# Patient Record
Sex: MALE | Race: WHITE | NOT HISPANIC OR LATINO | Employment: FULL TIME | URBAN - METROPOLITAN AREA
[De-identification: names, ages, dates, MRNs, and addresses within clinical notes are randomized per-mention and may not be internally consistent; named-entity substitution may affect disease eponyms.]

---

## 2017-02-13 ENCOUNTER — ALLSCRIPTS OFFICE VISIT (OUTPATIENT)
Dept: OTHER | Facility: OTHER | Age: 42
End: 2017-02-13

## 2017-03-10 ENCOUNTER — GENERIC CONVERSION - ENCOUNTER (OUTPATIENT)
Dept: OTHER | Facility: OTHER | Age: 42
End: 2017-03-10

## 2017-03-10 DIAGNOSIS — R74.8 ABNORMAL LEVELS OF OTHER SERUM ENZYMES: ICD-10-CM

## 2017-03-10 DIAGNOSIS — E55.9 VITAMIN D DEFICIENCY: ICD-10-CM

## 2017-03-10 DIAGNOSIS — E66.3 OVERWEIGHT: ICD-10-CM

## 2017-03-10 DIAGNOSIS — I10 ESSENTIAL (PRIMARY) HYPERTENSION: ICD-10-CM

## 2017-03-10 DIAGNOSIS — E78.5 HYPERLIPIDEMIA: ICD-10-CM

## 2017-05-02 ENCOUNTER — GENERIC CONVERSION - ENCOUNTER (OUTPATIENT)
Dept: OTHER | Facility: OTHER | Age: 42
End: 2017-05-02

## 2017-05-02 ENCOUNTER — LAB CONVERSION - ENCOUNTER (OUTPATIENT)
Dept: OTHER | Facility: OTHER | Age: 42
End: 2017-05-02

## 2017-05-02 LAB
25(OH)D3 SERPL-MCNC: 30 NG/ML (ref 30–100)
A/G RATIO (HISTORICAL): 1.4 (CALC) (ref 1–2.5)
ALBUMIN SERPL BCP-MCNC: 4.4 G/DL (ref 3.6–5.1)
ALP SERPL-CCNC: 69 U/L (ref 40–115)
ALT SERPL W P-5'-P-CCNC: 16 U/L (ref 9–46)
AST SERPL W P-5'-P-CCNC: 16 U/L (ref 10–40)
BASOPHILS # BLD AUTO: 0.8 %
BASOPHILS # BLD AUTO: 58 CELLS/UL (ref 0–200)
BILIRUB SERPL-MCNC: 0.7 MG/DL (ref 0.2–1.2)
BILIRUB UR QL STRIP: NEGATIVE
BUN SERPL-MCNC: 10 MG/DL (ref 7–25)
BUN/CREA RATIO (HISTORICAL): NORMAL (CALC) (ref 6–22)
CALCIUM SERPL-MCNC: 9.8 MG/DL (ref 8.6–10.3)
CHLORIDE SERPL-SCNC: 99 MMOL/L (ref 98–110)
CHOLEST SERPL-MCNC: 181 MG/DL (ref 125–200)
CHOLEST/HDLC SERPL: 4.5 (CALC)
CO2 SERPL-SCNC: 27 MMOL/L (ref 20–31)
COLOR UR: YELLOW
COMMENT (HISTORICAL): CLEAR
CREAT SERPL-MCNC: 0.99 MG/DL (ref 0.6–1.35)
DEPRECATED RDW RBC AUTO: 13.1 % (ref 11–15)
EGFR AFRICAN AMERICAN (HISTORICAL): 108 ML/MIN/1.73M2
EGFR-AMERICAN CALC (HISTORICAL): 94 ML/MIN/1.73M2
EOSINOPHIL # BLD AUTO: 1.9 %
EOSINOPHIL # BLD AUTO: 137 CELLS/UL (ref 15–500)
FECAL OCCULT BLOOD DIAGNOSTIC (HISTORICAL): NEGATIVE
GAMMA GLOBULIN (HISTORICAL): 3.2 G/DL (CALC) (ref 1.9–3.7)
GLUCOSE (HISTORICAL): 88 MG/DL (ref 65–99)
GLUCOSE (HISTORICAL): NEGATIVE
HBA1C MFR BLD HPLC: 5.4 % OF TOTAL HGB
HCT VFR BLD AUTO: 45.1 % (ref 38.5–50)
HDLC SERPL-MCNC: 40 MG/DL
HGB BLD-MCNC: 15.3 G/DL (ref 13.2–17.1)
KETONES UR STRIP-MCNC: NEGATIVE MG/DL
LDL CHOLESTEROL (HISTORICAL): 125 MG/DL (CALC)
LEUKOCYTE ESTERASE UR QL STRIP: NEGATIVE
LYMPHOCYTES # BLD AUTO: 2246 CELLS/UL (ref 850–3900)
LYMPHOCYTES # BLD AUTO: 31.2 %
MCH RBC QN AUTO: 28.8 PG (ref 27–33)
MCHC RBC AUTO-ENTMCNC: 34 G/DL (ref 32–36)
MCV RBC AUTO: 84.7 FL (ref 80–100)
MONOCYTES # BLD AUTO: 648 CELLS/UL (ref 200–950)
MONOCYTES (HISTORICAL): 9 %
NEUTROPHILS # BLD AUTO: 4111 CELLS/UL (ref 1500–7800)
NEUTROPHILS # BLD AUTO: 57.1 %
NITRITE UR QL STRIP: NEGATIVE
NON-HDL-CHOL (CHOL-HDL) (HISTORICAL): 141 MG/DL (CALC)
PH UR STRIP.AUTO: 7 [PH] (ref 5–8)
PLATELET # BLD AUTO: 364 THOUSAND/UL (ref 140–400)
PMV BLD AUTO: 7.6 FL (ref 7.5–12.5)
POTASSIUM SERPL-SCNC: 3.8 MMOL/L (ref 3.5–5.3)
PROT UR STRIP-MCNC: NEGATIVE MG/DL
RBC # BLD AUTO: 5.32 MILLION/UL (ref 4.2–5.8)
SODIUM SERPL-SCNC: 137 MMOL/L (ref 135–146)
SP GR UR STRIP.AUTO: 1.01 (ref 1–1.03)
TOTAL PROTEIN (HISTORICAL): 7.6 G/DL (ref 6.1–8.1)
TRIGL SERPL-MCNC: 78 MG/DL
TSH SERPL DL<=0.05 MIU/L-ACNC: 1.67 MIU/L (ref 0.4–4.5)
WBC # BLD AUTO: 7.2 THOUSAND/UL (ref 3.8–10.8)

## 2017-05-18 ENCOUNTER — ALLSCRIPTS OFFICE VISIT (OUTPATIENT)
Dept: OTHER | Facility: OTHER | Age: 42
End: 2017-05-18

## 2017-06-29 ENCOUNTER — GENERIC CONVERSION - ENCOUNTER (OUTPATIENT)
Dept: OTHER | Facility: OTHER | Age: 42
End: 2017-06-29

## 2017-07-17 ENCOUNTER — ALLSCRIPTS OFFICE VISIT (OUTPATIENT)
Dept: OTHER | Facility: OTHER | Age: 42
End: 2017-07-17

## 2017-11-08 ENCOUNTER — OFFICE VISIT (OUTPATIENT)
Dept: URGENT CARE | Facility: MEDICAL CENTER | Age: 42
End: 2017-11-08
Payer: COMMERCIAL

## 2017-11-08 PROCEDURE — 87430 STREP A AG IA: CPT

## 2017-11-08 PROCEDURE — G0382 LEV 3 HOSP TYPE B ED VISIT: HCPCS

## 2017-11-08 PROCEDURE — S9083 URGENT CARE CENTER GLOBAL: HCPCS

## 2017-11-10 NOTE — PROGRESS NOTES
Assessment    1  Sore throat (462) (J02 9)   2  Seasonal allergies (477 9) (J30 2)    Plan  Sore throat    · Rapid StrepA- POC; Source:Throat; Status:Resulted - Requires Verification,RetrospectiveBy Protocol Authorization;   Done: 48SQN9787 10:15AM    Discussion/Summary  Discussion Summary:   Patient's symptoms are likely consistent with seasonal allergies predominantly leading to congestion, sore throat and eustachian tube dysfunction  Overall examination is reassuring  Advised to continue with over-the-counter antiallergy medications  Valsalva maneuver advised  Steam inhalation advised  Rapid strep test is negative  Follow-up with PCP for reevaluation if any worsening of the symptoms in 7-10 days  Medication Side Effects Reviewed: Possible side effects of new medications were reviewed with the patient/guardian today  Understands and agrees with treatment plan: The treatment plan was reviewed with the patient/guardian  The patient/guardian understands and agrees with the treatment plan      Chief Complaint    1  Sore Throat  Chief Complaint Free Text Note Form: Patient here with a sore throat, headache and diarrhea  He states that his daughter was diagnosed with Strep throat yesterday  History of Present Illness  Hospital Based Practices Required Assessment: The pain is located in the throat  (on a scale of 0 to 10, the patient rates the pain at 5 )  Abuse And Domestic Violence Screen   Yes, the patient is safe at home  -- The patient states no one is hurting them  Depression And Suicide Screen  No, the patient has not had thoughts of hurting themself  No, the patient has not felt depressed in the past 7 days  Prefered Language is  [place]  Primary Language is  English  Sore Throat: Shreya Roberts presents with complaints of sore throat    Associated symptoms include dysphagia,-- nasal congestion,-- postnasal drainage-- and-- swollen glands, but-- no odynophagia,-- no myalgias,-- no drooling,-- no stridor,-- no fever,-- no chills,-- no headache,-- no hoarseness,-- no neck stiffness,-- no ear pain,-- no facial pain,-- no abdominal pain,-- no nausea,-- no vomiting,-- no cough,-- no rash,-- no anorexia-- and-- no fatigue  Review of Systems  Focused-Male:  Constitutional: no fever or chills, feels well, no tiredness, no recent weight loss or weight gain  Gastrointestinal: no complaints of abdominal pain, no constipation, no nausea or vomiting, no diarrhea or bloody stools  Active Problems  1  Allergic rhinitis (477 9) (J30 9)   2  Anxiety (300 00) (F41 9)   3  Benign essential hypertension (401 1) (I10)   4  Depression screening (V79 0) (Z13 89)   5  Dysfunction of both eustachian tubes (381 81) (H69 83)   6  Erectile dysfunction of non-organic origin (302 72) (F52 21)   7  Esophageal reflux (530 81) (K21 9)   8  History of hyperlipidemia (V12 29) (Z86 39)   9  Overweight (278 02) (E66 3)   10  History of Vitamin D deficiency (268 9) (E55 9)    Past Medical History  1  History of Abnormal liver enzymes (790 5) (R74 8)   2  History of Anal irritation (569 49) (K62 89)   3  History of Closed Fracture Of The Fifth Metacarpal Bone (815 00)   4  History of acute sinusitis (V12 69) (Z87 09)   5  History of alcoholism (V11 3) (F10 21)   6  History of hemorrhoids (V13 89) (Z87 19)   7  History of hyperlipidemia (V12 29) (Z86 39)   8  History of shortness of breath (V13 89) (Z87 898)   9  History of streptococcal pharyngitis (V12 09) (Z87 09)   10  History of Neck pain (723 1) (M54 2)   11  Overweight (278 02) (E66 3)   12  History of Sore throat (462) (J02 9)   13  History of Umbilical hernia (269 4) (K42 9)   14  History of Vitamin D deficiency (268 9) (E55 9)    Family History  Father    1  Family history of Colon Cancer (V16 0)   2  Family history of Hypertension (V17 49)  Brother    3   Family history of Congenital Heart Disease    Social History   · Alcohol Use (History)   · Education History   · Former smoker (V15 82) (Z87 891)   · Marital History - Currently    · Occupation:   · Recently Stopping Smoking (V15 82)    Surgical History    1  History of Complex Repair Of Wound    Current Meds   1  ALPRAZolam 0 5 MG Oral Tablet; TAKE 1 TABLET BY MOUTH TWICE A DAY AS NEEDED FOR ANXIETY; Therapy: 87SHK8579 to (Evaluate:75Ozn2380); Last Rx:20Xbz8342 Ordered   2  Cialis 20 MG Oral Tablet; TAKE 1 TABLET 1 HOUR BEFORE ACTIVITY AS NEEDED; Therapy: 01Nsi7756 to (Last Rx:28Lkc4132)  Requested for: 52IUH9804 Ordered   3  DilTIAZem HCl ER Coated Beads 300 MG Oral Tablet Extended Release 24 Hour; TAKE 1 TABLET DAILY; Therapy: 30Epp6328 to (Evaluate:29Odl8336)  Requested for: 46KLL6432; Last Rx:69Bwz6823 Ordered   4  Fluticasone Propionate 50 MCG/ACT Nasal Suspension; instill 2 sprays into each nostril once daily; Therapy: 85WMM6350 to (Evaluate:12Apr2016) Recorded   5  HydroCHLOROthiazide 25 MG Oral Tablet; Take 1 tablet daily; Therapy: 52VYU0327 to (NVWJCHYI:09ZTV6572)  Requested for: 89YIG2225; Last Rx:29Jun2017 Ordered   6  Sertraline HCl - 100 MG Oral Tablet; TAKE 1 TABLET DAILY AS     DIRECTED; Therapy: 46Mzf7439 to (Grulla Batters)  Requested for: 89KXR4842; Last Rx:10Mar2017 Ordered    Allergies    1  Valsartan TABS   2  Lisinopril TABS   3  Norvasc TABS    Vitals  Signs   Recorded: 21RNK8374 09:59AM   Temperature: 97 2 F, Tympanic  Heart Rate: 79  Respiration: 20  Systolic: 466  Diastolic: 78  Height: 6 ft   Weight: 220 lb   BMI Calculated: 29 84  BSA Calculated: 2 22  O2 Saturation: 99  Pain Scale: 5    Physical Exam   Constitutional  General appearance: No acute distress, well appearing and well nourished  Ears, Nose, Mouth, and Throat  External inspection of ears and nose: Normal    Otoscopic examination: Tympanic membrance translucent with normal light reflex  Canals patent without erythema  Nasal mucosa, septum, and turbinates: Abnormal   There was a mucoid discharge from both nares   The bilateral nasal mucosa was edematous  Oropharynx: Normal with no erythema, edema, exudate or lesions  Pulmonary  Respiratory effort: No increased work of breathing or signs of respiratory distress  Auscultation of lungs: Clear to auscultation  Cardiovascular  Auscultation of heart: Normal rate and rhythm, normal S1 and S2, without murmurs  Lymphatic  Palpation of lymph nodes in neck: Abnormal   bilateral anterior cervical node enlargement, but-- no posterior cervical node enlargement-- and-- no submandibular node enlargement        Results/Data  Rapid Chyrel Lango- POC 46IDQ5020 10:15AM Marina Bautista     Test Name Result Flag Reference   Rapid Strep Negative                     Future Appointments    Date/Time Provider Specialty Site   01/22/2018 08:00 AM Joy Diaz [facility]       Signatures   Electronically signed by : CLAUDIA Kaur ; Nov 8 2017  4:45PM EST                       (Author)

## 2018-01-12 VITALS
DIASTOLIC BLOOD PRESSURE: 78 MMHG | HEART RATE: 90 BPM | WEIGHT: 219 LBS | TEMPERATURE: 97.1 F | BODY MASS INDEX: 29.66 KG/M2 | HEIGHT: 72 IN | RESPIRATION RATE: 18 BRPM | OXYGEN SATURATION: 97 % | SYSTOLIC BLOOD PRESSURE: 124 MMHG

## 2018-01-12 NOTE — MISCELLANEOUS
Message  Called and spoke with patient in response to his calling office earlier today regarding issues he is having with his BP medication, which was recently changed to valsartan HCT  States it is causing him dizziness, nausea  Stopped this medication yesterday, back on diltiazem today, feeling better--symptoms largely resolved  Requesting refill of (previous) HCTZ alone  Has not checked BP lately  Advise trial of diltiazem only x 1 week, restarting HCTZ also if home BP readings > 140/90 (previous ED side effects most likely from HCTZ)  F/u in office on 7/17 as scheduled  Call if ongoing/new issues prior  Plan  Benign essential hypertension    · DilTIAZem HCl ER Beads 240 MG Oral Capsule Extended Release 24 Hour   · DilTIAZem HCl ER Coated Beads 300 MG Oral Tablet Extended Release 24  Hour; TAKE 1 TABLET DAILY   · HydroCHLOROthiazide 25 MG Oral Tablet;  Take 1 tablet daily    Signatures   Electronically signed by : Nathaly Coppola; Jun 29 2017  5:19PM EST                       (Author)

## 2018-01-13 NOTE — RESULT NOTES
Discussion/Summary      Normal blood work results including cholesterol numbers  Verified Results  (1) CBC/PLT/DIFF [id number] 08:04AM [facility]     Test Name Result Flag Reference   WHITE BLOOD CELL COUNT 7 2 Thousand/uL  3 8-10 8   RED BLOOD CELL COUNT 5 32 Million/uL  4 20-5 80   HEMOGLOBIN 15 3 g/dL  13 2-17 1   HEMATOCRIT 45 1 %  38 5-50 0   MCV 84 7 fL  80 0-100 0   MCH 28 8 pg  27 0-33 0   MCHC 34 0 g/dL  32 0-36 0   RDW 13 1 %  11 0-15 0   PLATELET COUNT 301 Thousand/uL  140-400   ABSOLUTE NEUTROPHILS 4111 cells/uL  1424-4154   ABSOLUTE LYMPHOCYTES 2246 cells/uL  850-3900   ABSOLUTE MONOCYTES 648 cells/uL  200-950   ABSOLUTE EOSINOPHILS 137 cells/uL     ABSOLUTE BASOPHILS 58 cells/uL  0-200   NEUTROPHILS 57 1 %     LYMPHOCYTES 31 2 %     MONOCYTES 9 0 %     EOSINOPHILS 1 9 %     BASOPHILS 0 8 %     MPV 7 6 fL  7 5-12 5     (1) COMPREHENSIVE METABOLIC PANEL 19LZE6261 30:13SW [employer]     Test Name Result Flag Reference   GLUCOSE 88 mg/dL  65-99   Fasting reference interval   UREA NITROGEN (BUN) 10 mg/dL  7-25   CREATININE 0 99 mg/dL  0 60-1 35   eGFR NON-AFR   AMERICAN 94 mL/min/1 73m2  > OR = 60   eGFR AFRICAN AMERICAN 108 mL/min/1 73m2  > OR = 60   BUN/CREATININE RATIO   5-29   NOT APPLICABLE (calc)   SODIUM 137 mmol/L  135-146   POTASSIUM 3 8 mmol/L  3 5-5 3   CHLORIDE 99 mmol/L     CARBON DIOXIDE 27 mmol/L  20-31   CALCIUM 9 8 mg/dL  8 6-10 3   PROTEIN, TOTAL 7 6 g/dL  6 1-8 1   ALBUMIN 4 4 g/dL  3 6-5 1   GLOBULIN 3 2 g/dL (calc)  1 9-3 7   ALBUMIN/GLOBULIN RATIO 1 4 (calc)  1 0-2 5   BILIRUBIN, TOTAL 0 7 mg/dL  0 2-1 2   ALKALINE PHOSPHATASE 69 U/L     AST 16 U/L  10-40   ALT 16 U/L  9-46     (Q) LIPID PANEL WITH REFLEX TO DIRECT LDL [id number] 08:04AM [facility] [employer]     Test Name Result Flag Reference   CHOLESTEROL, TOTAL 181 mg/dL  125-200   HDL CHOLESTEROL 40 mg/dL  > OR = 40   TRIGLICERIDES 78 mg/dL  <262   LDL-CHOLESTEROL 125 mg/dL (calc)  <130   Desirable range <100 mg/dL for patients with CHD or  diabetes and <70 mg/dL for diabetic patients with  known heart disease  CHOL/HDLC RATIO 4 5 (calc)  < OR = 5 0   NON HDL CHOLESTEROL 141 mg/dL (calc)     Target for non-HDL cholesterol is 30 mg/dL higher than   LDL cholesterol target  *(Q) VITAMIN D, 25-HYDROXY, LC/MS/MS [id number] 08:04AM [facility]     Test Name Result Flag Reference   VITAMIN D, 25-OH, TOTAL 30 ng/mL     Vitamin D Status         25-OH Vitamin D:     Deficiency:                    <20 ng/mL  Insufficiency:             20 - 29 ng/mL  Optimal:                 > or = 30 ng/mL     For 25-OH Vitamin D testing on patients on   D2-supplementation and patients for whom quantitation   of D2 and D3 fractions is required, the QuestAssureD(TM)  25-OH VIT D, (D2,D3), LC/MS/MS is recommended: order   code 98412 (patients >2yrs)  For more information on this test, go to:  http://[employer]/faq/IRZ934  (This link is being provided for   informational/educational purposes only )     (Q) TSH, 3RD GENERATION W/REFLEX TO FT4 [id number] 08:04AM [facility]     Test Name Result Flag Reference   TSH W/REFLEX TO FT4 1 67 mIU/L  0 40-4 50     (Q) HEMOGLOBIN A1c [id number] 08:04AM [facility]     Test Name Result Flag Reference   HEMOGLOBIN A1c 5 4 % of total Hgb  <5 7   For the purpose of screening for the presence of  diabetes:     <5 7%       Consistent with the absence of diabetes  5 7-6 4%    Consistent with increased risk for diabetes              (prediabetes)  > or =6 5%  Consistent with diabetes     This assay result is consistent with a decreased risk  of diabetes  Currently, no consensus exists regarding use of  hemoglobin A1c for diagnosis of diabetes in children  According to American Diabetes Association (ADA)  guidelines, hemoglobin A1c <7 0% represents optimal  control in non-pregnant diabetic patients  Different  metrics may apply to specific patient populations     Standards of Medical Care in Diabetes(ADA)       (Q) URINALYSIS REFLEX [id number] 08:04AM Erika Carranza   REPORT COMMENT:  FASTING:YES     Test Name Result Flag Reference   COLOR YELLOW  YELLOW   APPEARANCE CLEAR  CLEAR   SPECIFIC GRAVITY 1 007  1 001-1 035   PH 7 0  5 0-8 0   GLUCOSE NEGATIVE  NEGATIVE   BILIRUBIN NEGATIVE  NEGATIVE   KETONES NEGATIVE  NEGATIVE   OCCULT BLOOD NEGATIVE  NEGATIVE   PROTEIN NEGATIVE  NEGATIVE   NITRITE NEGATIVE  NEGATIVE   LEUKOCYTE ESTERASE NEGATIVE  NEGATIVE

## 2018-01-14 VITALS
HEIGHT: 72 IN | BODY MASS INDEX: 29.8 KG/M2 | DIASTOLIC BLOOD PRESSURE: 86 MMHG | SYSTOLIC BLOOD PRESSURE: 124 MMHG | WEIGHT: 220 LBS | RESPIRATION RATE: 17 BRPM | TEMPERATURE: 98.1 F

## 2018-01-15 DIAGNOSIS — I10 ESSENTIAL (PRIMARY) HYPERTENSION: ICD-10-CM

## 2018-01-15 NOTE — RESULT NOTES
Verified Results  (Q) CBC (INCLUDES DIFF/PLT) WITH SMEAR REVIEW [id number] 07:28AM Nelson Hernandez     Test Name Result Flag Reference   WHITE BLOOD CELL COUNT 6 9 Thousand/uL  3 8-10 8   RED BLOOD CELL COUNT 5 60 Million/uL  4 20-5 80   HEMOGLOBIN 16 0 g/dL  13 2-17 1   HEMATOCRIT 48 7 %  38 5-50 0   MCV 87 0 fL  80 0-100 0   MCH 28 5 pg  27 0-33 0   MCHC 32 8 g/dL  32 0-36 0   RDW 13 5 %  11 0-15 0   PLATELET COUNT 575 Thousand/uL H 140-400   MPV 7 5 fL  7 5-11 5   ABSOLUTE NEUTROPHILS 3236 cells/uL  6192-3776   ABSOLUTE LYMPHOCYTES 2946 cells/uL  850-3900   ABSOLUTE MONOCYTES 573 cells/uL  200-950   ABSOLUTE EOSINOPHILS 76 cells/uL     ABSOLUTE BASOPHILS 69 cells/uL  0-200   NEUTROPHILS 46 9 %     LYMPHOCYTES 42 7 %     MONOCYTES 8 3 %     EOSINOPHILS 1 1 %     BASOPHILS 1 0 %       (1) COMPREHENSIVE METABOLIC PANEL [id number] 27:47RS [employer]     Test Name Result Flag Reference   GLUCOSE 79 mg/dL  65-99   Fasting reference interval   UREA NITROGEN (BUN) 13 mg/dL  7-25   CREATININE 0 98 mg/dL  0 60-1 35   eGFR NON-AFR   AMERICAN 95 mL/min/1 73m2  > OR = 60   eGFR AFRICAN AMERICAN 111 mL/min/1 73m2  > OR = 60   BUN/CREATININE RATIO   7-30   NOT APPLICABLE (calc)   SODIUM 136 mmol/L  135-146   POTASSIUM 4 2 mmol/L  3 5-5 3   CHLORIDE 98 mmol/L     CARBON DIOXIDE 26 mmol/L  19-30   CALCIUM 10 2 mg/dL  8 6-10 3   PROTEIN, TOTAL 7 8 g/dL  6 1-8 1   ALBUMIN 4 6 g/dL  3 6-5 1   GLOBULIN 3 2 g/dL (calc)  1 9-3 7   ALBUMIN/GLOBULIN RATIO 1 4 (calc)  1 0-2 5   BILIRUBIN, TOTAL 0 7 mg/dL  0 2-1 2   ALKALINE PHOSPHATASE 64 U/L     AST 14 U/L  10-40   ALT 18 U/L  9-46     (Q) LIPID PANEL WITH REFLEX TO DIRECT LDL [id number] 07:28AM [facility]     Test Name Result Flag Reference   CHOLESTEROL, TOTAL 218 mg/dL H 125-200   HDL CHOLESTEROL 39 mg/dL L > OR = 40   TRIGLICERIDES 286 mg/dL H <150   LDL-CHOLESTEROL 147 mg/dL (calc) H <130   Desirable range <100 mg/dL for patients with CHD or  diabetes and <70 mg/dL for diabetic patients with  known heart disease  CHOL/HDLC RATIO 5 6 (calc) H < OR = 5 0   NON HDL CHOLESTEROL 179 mg/dL (calc) H    Target for non-HDL cholesterol is 30 mg/dL higher than   LDL cholesterol target  (Q) TSH, 3RD GENERATION W/REFLEX TO FT4 [id number] 07:28AM Samantha Scott     Test Name Result Flag Reference   TSH W/REFLEX TO FT4 1 65 mIU/L  0 40-4 50     (Q) URINALYSIS REFLEX [id number] 07:28AM Samantha Scott   REPORT COMMENT:  FASTING:YES     Test Name Result Flag Reference   COLOR YELLOW  YELLOW   APPEARANCE CLEAR  CLEAR   SPECIFIC GRAVITY 1 019  1 001-1 035   [facility] 6 5  5 0-8 0   GLUCOSE NEGATIVE  NEGATIVE   BILIRUBIN NEGATIVE  NEGATIVE   KETONES NEGATIVE  NEGATIVE   OCCULT BLOOD NEGATIVE  NEGATIVE   PROTEIN NEGATIVE  NEGATIVE   NITRITE NEGATIVE  NEGATIVE   LEUKOCYTE ESTERASE NEGATIVE  NEGATIVE     *(Q) VITAMIN D, 25-HYDROXY, LC/MS/MS [id number] 07:28AM Samantha Scott     Test Name Result Flag Reference   VITAMIN D, 25-OH, TOTAL 18 ng/mL L    Vitamin D Status         25-OH Vitamin D:     Deficiency:                    <20 ng/mL  Insufficiency:             20 - 29 ng/mL  Optimal:                 > or = 30 ng/mL     For 25-OH Vitamin D testing on patients on   D2-supplementation and patients for whom quantitation   of D2 and D3 fractions is required, the QuestAssureD(TM)  25-OH VIT D, (D2,D3), LC/MS/MS is recommended: order   code 93934 (patients >2yrs)  For more information on this test, go to:  http://[employer]/faq/CLZ347  (This link is being provided for   informational/educational purposes only )       Discussion/Summary   Your vitamin D is low  Buy some vitamin D 2000 I  U  over the counter and start one a day  Everything else is OK  The cholesterol is the best it has been in several years  We'll discuss further at your next appointment   Dr March Claude

## 2018-01-16 ENCOUNTER — LAB CONVERSION - ENCOUNTER (OUTPATIENT)
Dept: OTHER | Facility: OTHER | Age: 43
End: 2018-01-16

## 2018-01-16 LAB
A/G RATIO (HISTORICAL): 1.6 (CALC) (ref 1–2.5)
ALBUMIN SERPL BCP-MCNC: 4.6 G/DL (ref 3.6–5.1)
ALP SERPL-CCNC: 61 U/L (ref 40–115)
ALT SERPL W P-5'-P-CCNC: 17 U/L (ref 9–46)
AST SERPL W P-5'-P-CCNC: 14 U/L (ref 10–40)
BILIRUB SERPL-MCNC: 0.7 MG/DL (ref 0.2–1.2)
BUN SERPL-MCNC: 11 MG/DL (ref 7–25)
BUN/CREA RATIO (HISTORICAL): NORMAL (CALC) (ref 6–22)
CALCIUM SERPL-MCNC: 9.9 MG/DL (ref 8.6–10.3)
CHLORIDE SERPL-SCNC: 101 MMOL/L (ref 98–110)
CO2 SERPL-SCNC: 29 MMOL/L (ref 20–31)
CREAT SERPL-MCNC: 1.02 MG/DL (ref 0.6–1.35)
EGFR AFRICAN AMERICAN (HISTORICAL): 104 ML/MIN/1.73M2
EGFR-AMERICAN CALC (HISTORICAL): 90 ML/MIN/1.73M2
GAMMA GLOBULIN (HISTORICAL): 2.8 G/DL (CALC) (ref 1.9–3.7)
GLUCOSE (HISTORICAL): 92 MG/DL (ref 65–99)
POTASSIUM SERPL-SCNC: 4.6 MMOL/L (ref 3.5–5.3)
SODIUM SERPL-SCNC: 136 MMOL/L (ref 135–146)
TOTAL PROTEIN (HISTORICAL): 7.4 G/DL (ref 6.1–8.1)

## 2018-01-16 NOTE — MISCELLANEOUS
Provider Comments  Provider Comments:   Patient did not show for his appointment nor did he call to cancel or reschedule  mlo [phone]      Signatures   Electronically signed by : Arsh Moreira DO; Feb 13 2017  9:59AM EST                       (Author)

## 2018-01-18 ENCOUNTER — GENERIC CONVERSION - ENCOUNTER (OUTPATIENT)
Dept: OTHER | Facility: OTHER | Age: 43
End: 2018-01-18

## 2018-01-18 NOTE — MISCELLANEOUS
Message  Return to work or school:   Randi Brantley is under my professional care  He was seen in my office on 11/11/2016   He is able to return to work on  11/14/2016            Signatures   Electronically signed by : BAUDILIO Victoria; Nov 11 2016 12:23PM EST                       (Author)    Electronically signed by :  BAUDILIO Victoria; Nov 11 2016 12:24PM EST

## 2018-01-23 NOTE — RESULT NOTES
Discussion/Summary      NORMAL lab results  Call if questions--uAgust      Verified Results  (1) COMPREHENSIVE METABOLIC PANEL [id number] 84:52MW Jerica Ingramharshil   REPORT COMMENT:  FASTING:NO     Test Name Result Flag Reference   GLUCOSE 92 mg/dL  65-99   Fasting reference interval   UREA NITROGEN (BUN) 11 mg/dL  7-25   CREATININE 1 02 mg/dL  0 60-1 35   eGFR NON-AFR   AMERICAN 90 mL/min/1 73m2  > OR = 60   eGFR AFRICAN AMERICAN 104 mL/min/1 73m2  > OR = 60   BUN/CREATININE RATIO   4-52   NOT APPLICABLE (calc)   SODIUM 136 mmol/L  135-146   POTASSIUM 4 6 mmol/L  3 5-5 3   CHLORIDE 101 mmol/L     CARBON DIOXIDE 29 mmol/L  20-31   CALCIUM 9 9 mg/dL  8 6-10 3   PROTEIN, TOTAL 7 4 g/dL  6 1-8 1   ALBUMIN 4 6 g/dL  3 6-5 1   GLOBULIN 2 8 g/dL (calc)  1 9-3 7   ALBUMIN/GLOBULIN RATIO 1 6 (calc)  1 0-2 5   BILIRUBIN, TOTAL 0 7 mg/dL  0 2-1 2   ALKALINE PHOSPHATASE 61 U/L     AST 14 U/L  10-40   ALT 17 U/L  9-46

## 2018-02-06 ENCOUNTER — TELEPHONE (OUTPATIENT)
Dept: FAMILY MEDICINE CLINIC | Facility: OTHER | Age: 43
End: 2018-02-06

## 2018-02-06 DIAGNOSIS — N52.9 ERECTILE DYSFUNCTION, UNSPECIFIED ERECTILE DYSFUNCTION TYPE: Primary | ICD-10-CM

## 2018-02-06 RX ORDER — SILDENAFIL CITRATE 20 MG/1
20 TABLET ORAL 3 TIMES DAILY
Qty: 50 TABLET | Refills: 1 | Status: SHIPPED | OUTPATIENT
Start: 2018-02-06

## 2018-03-10 DIAGNOSIS — I10 ESSENTIAL HYPERTENSION: Primary | ICD-10-CM

## 2018-03-10 DIAGNOSIS — F32.A DEPRESSION, UNSPECIFIED DEPRESSION TYPE: ICD-10-CM

## 2018-03-11 RX ORDER — SERTRALINE HYDROCHLORIDE 100 MG/1
TABLET, FILM COATED ORAL
Qty: 90 TABLET | Refills: 3 | Status: SHIPPED | OUTPATIENT
Start: 2018-03-11

## 2018-03-11 RX ORDER — DILTIAZEM HYDROCHLORIDE 300 MG/1
CAPSULE, COATED, EXTENDED RELEASE ORAL
Qty: 90 CAPSULE | Refills: 3 | Status: SHIPPED | OUTPATIENT
Start: 2018-03-11

## 2018-03-14 DIAGNOSIS — F41.9 ANXIETY: Primary | ICD-10-CM

## 2018-03-14 RX ORDER — ALPRAZOLAM 0.5 MG/1
1 TABLET ORAL 2 TIMES DAILY PRN
COMMUNITY
Start: 2017-05-18 | End: 2018-03-14 | Stop reason: SDUPTHER

## 2018-03-15 RX ORDER — ALPRAZOLAM 0.5 MG/1
TABLET ORAL
Qty: 30 TABLET | Refills: 2 | OUTPATIENT
Start: 2018-03-15 | End: 2018-05-11 | Stop reason: SDUPTHER

## 2018-03-29 ENCOUNTER — TRANSCRIBE ORDERS (OUTPATIENT)
Dept: ADMINISTRATIVE | Facility: HOSPITAL | Age: 43
End: 2018-03-29

## 2018-03-29 DIAGNOSIS — I65.21 CAROTID OCCLUSION, RIGHT: Primary | ICD-10-CM

## 2018-04-03 ENCOUNTER — HOSPITAL ENCOUNTER (OUTPATIENT)
Dept: CT IMAGING | Facility: HOSPITAL | Age: 43
Discharge: HOME | End: 2018-04-03
Payer: COMMERCIAL

## 2018-04-03 DIAGNOSIS — I65.21 CAROTID OCCLUSION, RIGHT: ICD-10-CM

## 2018-04-03 PROCEDURE — 70491 CT SOFT TISSUE NECK W/DYE: CPT

## 2018-04-03 RX ADMIN — IOHEXOL 85 ML: 350 INJECTION, SOLUTION INTRAVENOUS at 20:00

## 2018-05-11 DIAGNOSIS — F41.9 ANXIETY: ICD-10-CM

## 2018-05-11 RX ORDER — ALPRAZOLAM 0.5 MG/1
TABLET ORAL
Qty: 30 TABLET | Refills: 3 | OUTPATIENT
Start: 2018-05-11

## 2018-05-14 ENCOUNTER — TELEPHONE (OUTPATIENT)
Dept: FAMILY MEDICINE CLINIC | Facility: OTHER | Age: 43
End: 2018-05-14

## 2018-05-14 NOTE — TELEPHONE ENCOUNTER
Xanax prescription should read: Alprazolam 0 5 mg take 1 tablet twice daily as needed for anxiety  Dispense #30 tablets  NO REFILLS    Please make it clear that there should be NO REFILLS on this medication as it is a controlled substance, per office policy  Thank you for the clarification!   Wiley Pate DO

## 2018-11-05 DIAGNOSIS — F41.9 ANXIETY DISORDER: ICD-10-CM

## 2018-11-05 DIAGNOSIS — E66.3 OVERWEIGHT: ICD-10-CM

## 2018-11-05 DIAGNOSIS — E55.9 VITAMIN D DEFICIENCY: ICD-10-CM

## 2018-11-05 DIAGNOSIS — I10 ESSENTIAL (PRIMARY) HYPERTENSION: ICD-10-CM

## 2018-11-05 DIAGNOSIS — Z86.39 PERSONAL HISTORY OF OTHER ENDOCRINE, NUTRITIONAL AND METABOLIC DISEASE: ICD-10-CM

## 2019-03-05 DIAGNOSIS — F32.A DEPRESSION, UNSPECIFIED DEPRESSION TYPE: ICD-10-CM

## 2019-03-05 DIAGNOSIS — I10 ESSENTIAL HYPERTENSION: ICD-10-CM

## 2019-03-05 RX ORDER — SERTRALINE HYDROCHLORIDE 100 MG/1
TABLET, FILM COATED ORAL
Qty: 90 TABLET | Refills: 3 | OUTPATIENT
Start: 2019-03-05

## 2019-03-05 RX ORDER — DILTIAZEM HYDROCHLORIDE 300 MG/1
CAPSULE, COATED, EXTENDED RELEASE ORAL
Qty: 90 CAPSULE | Refills: 3 | OUTPATIENT
Start: 2019-03-05

## 2023-08-09 ENCOUNTER — TELEPHONE (OUTPATIENT)
Dept: DERMATOLOGY | Facility: CLINIC | Age: 48
End: 2023-08-09